# Patient Record
Sex: FEMALE | Race: WHITE | Employment: FULL TIME | URBAN - METROPOLITAN AREA
[De-identification: names, ages, dates, MRNs, and addresses within clinical notes are randomized per-mention and may not be internally consistent; named-entity substitution may affect disease eponyms.]

---

## 2022-06-22 ENCOUNTER — HOSPITAL ENCOUNTER (OUTPATIENT)
Dept: PHYSICAL THERAPY | Age: 47
Setting detail: RECURRING SERIES
Discharge: HOME | End: 2022-06-25
Payer: COMMERCIAL

## 2022-06-22 PROCEDURE — 97165 OT EVAL LOW COMPLEX 30 MIN: CPT

## 2022-06-22 PROCEDURE — 97535 SELF CARE MNGMENT TRAINING: CPT

## 2022-06-22 NOTE — THERAPY EVALUATION
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 Restrictions/Precautions:    No data recordedNo data recorded  Medications Last Reviewed:  2022     SUBJECTIVE   History of Injury/Illness (Reason for Referral):  Per MD note[de-identified] 2022: Office visit (Dr. Guillermo Sosa): Last seen, 3/6/2018, by partner, Dr. Agus Pinon, lost to follow up. She has established diagnosis of lymphedema praecox (onset age 14) that for many years only involved the lower extremities. Over the last several years, she has complained of swelling, stiffness and skin thickening of the posterior neck, upper back, anterior chest. She is compliant with her CPAP but notes increased weight gain and SHIN. Last ECHO was in 9775 with diastolic dysfunction and no mention of RT heart failure. Disposition: Referral for MLD. Upgrade pump. Check ECHO Emphasized compression DAILY. Patient Stated Goal(s):   \"Keep swelling down in upper extremity, neck, legs, and abdomen\"  Initial:     4/10 Post Session:     3/10  Past Medical History/Comorbidities:   Ms. Thalia Sepulveda  has no past medical history on file. Ms. Thalia Sepulveda  has no past surgical history on file. Social History/Living Environment:   Type of Home: House     Prior Level of Function/Work/Activity:   Occupation: Full time employment; Works at home  No data recordedNo data recorded   Learning:   Does the patient/guardian have any barriers to learning?: No barriers     Fall Risk Scale  Total Score: 0  Wise Fall Risk: Low (0-24)           OBJECTIVE   Lymphedema:  Circumference Measurements (Lower Extremity):    Date:  6/22/22 Date:      Centimeters from base of heel Right Left Right Left   Foot   28 28.5     Ankle  33 33.5     Calf 10 down knee 47 48     Knee  46 43     Thigh          Skin Integumentary:  Skin Integrity: Cracked  Pitting Area 1 Described: medial calf  Pitting Scale Area 1: 3+  Pitting Additional Areas: Yes  Pitting Area 2 Described: infeior interior thigh  Pitting Scale Area 2: 1+  Pitting Area 3 Described:  (Brawny)  Pitting Area 4 Described: Posterior NEck  Pitting Scale Area 4: N/A (Brawny)  Hair Growth: Sparce  Edema Rebound: Slow  Stemmer Sign: Positive    LLIS:  Total Score: 25        ASSESSMENT   Initial Assessment:  Patient with Primary Lymphedema starting at age 14. 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 Patient has many problematic areas but focus will be primarly on BLE's and posterior neck as time allows. Positive for Stemmers sign. Will need gradual drainage as all extremities have some impairment. Posterior neck is extremely fibrotic and has some pain associated with posture deficits. Problem List (Impacting functional limitations):  Performance deficits / Impairments: Decreased functional mobility ; Decreased ADL status;  Decreased high-level IADLs     Therapy Prognosis:   Prognosis: Fair Assessment Complexity:   Medium Complexity  PLAN   Effective Dates: 0/68/43  TO Certification Period Expiration Date: 09/20/22   . Frequency/Duration: Plan Frequency: 5,5,3,3,     Interventions Planned: (Treatment may consist of any combination of the following)  Current Treatment Recommendations: Strengthening; Functional mobility training; Manual Therapy:  STM; Self-Care / ADL; Manual Therapy:  MLD     Goals: (Goals have been discussed and agreed upon with patient.)  Short-Term Functional Goals: Time Frame: 3- days  1. The patient/caregiver will verbalize understanding of lymphedema precautions. 2. Patient will be minimal assist with skin care regimen to decrease risk of cellulitis. 3. The patient/caregiver will be minimal assist at donning and doffing bilateral lower extremity compression bandages. 4. The patient/caregiver will be minimal assist with self-manual lymph drainage techniques and show decrease in limb volume. 5. Patient will be minimal assist in lymphatic exercises. 6. Patient will don and doff neck compression to break up fibrotic tissue. Discharge Goals: Time Frame: 90 days  1. Patient's bilateral lower extremity circumferential measurements will decrease on volumetric graph to maximize functional use in ADL's.    2. The patient/caregiver will be independent with home management of lymphedema. 3. Patient/caregiver will be independent donning and doffing  All compression garment. 4. Patient will increase LLIS score by 5 points. 1.            MEDICAL NECESSITY:   Skilled intervention continues to be required due to Deficits listed above. REASON FOR SERVICES/OTHER COMMENTS:  Patient continues to require skilled intervention due to Dx above. Total Duration:  Time In: 1330  Time Out: 1400    Regarding Gloria Beth MD's therapy, I certify that the treatment plan above will be carried out by a therapist or under their direction.   Thank you for this referral,  Casimiro Paget MARILY Ambrocio     Referring Physician Signature: Sunday Wheeler MD No Signature is Required for this note.         Post Session Pain  Charge Capture   POC Link  Treatment Note Link  MD Guidelines  MyChart

## 2022-06-22 NOTE — PROGRESS NOTES
Rosa Vizcaino MD  : 1975  Primary: Casi Danisha Sc  Secondary:  [address] @ [facility]  [place] [facility] [address]  Phone: [phone]  Fax: [phone] Plan Frequency: 0,2,6,3,    Certification Period Expiration Date: 22      OT Visit Info: Total # of Visits to Date: 1      OUTPATIENT OCCUPATIONAL THERAPY: Treatment Note 2022  Appt Desk   Episode      Treatment Diagnosis: See eval  Medical/Referring Diagnosis:  Lymphedema, not elsewhere classified [I89.0]  Referring Physician:  Kate Rivera MD MD Orders:  OT Eval and Treat   Date of Onset:  Onset Date: 87     Allergies:  Patient has no allergy information on record. Restrictions/Precautions:    No data recordedNo data recorded  Medications Last Reviewed:  2022     Interventions Planned: (Treatment may consist of any combination of the following)  Current Treatment Recommendations: Strengthening; Functional mobility training; Manual Therapy:  STM; Self-Care / ADL; Manual Therapy:  MLD     Subjective Comments: See eval     Initial:     4/10 Post Session:     3/10  Medications Last Reviewed:  2022  Updated Objective Findings:  See evaluation note from today  Treatment     Therapeutic Exercise ( Minutes): Therapeutic exercises noted below to improve functional AROM, strength, mobility and reduce limb size.        Date:   Date:   Date:     Activity/Exercise Parameters Parameters Parameters   Diaphragmatic breathing      Hip Flexion      Hip Abduction      Knee Flexion and extension      Ankle Pumps      Ankle Circles      Toe Scrunches              Manual Lymph Drainage:  Lymph Nodes:    Cervical Supraclavicular Axillary Abdominal Inguinal Popliteal Antecubital   RIGHT []     []     []     []     []     []     []       LEFT []     []     []     []     []     []     []         Anastamoses:   Axillo-axillary Inguino-inguinal Axillo-inguinal Inguino-axillary   ANTERIOR [] []     []     []       POSTERIOR []     []     []     []       RIGHT []     []     []     []       LEFT []     []     []     []         Limbs:   []    RUE     []    LUE     []    RLE    []    LLE    Self Care (15 minutes): Educated on CDT, Skin care, Diaphragmatic breathing, lymphatic health, diet, and exercise. · Treatment/Session Summary:  See evaluation  · Treatment Assessment:       · Communication/Consultation:  None today  · Equipment provided today:  None  · Recommendations/Intent for next treatment session: Next visit will focus on complete CDT.     Total Treatment Billable Duration: 15 minutes  OT Individual Minutes  Time In: 1330  Time Out: 1400  Minutes: [address], OT    Post Session Pain  Charge Capture   POC Link  Treatment Note Link  MD Guidelines  MyChart    Future Appointments   Date Time Provider Bassam Marmolejo   7/11/2022  4:00 PM [place] Bettie, OT [facility]   7/12/2022  4:00 PM [place] Bettie, OT [facility]   7/13/2022  4:00 PM [place], OT [facility]   7/14/2022  4:00 PM [place] Bettie, OT [facility]   7/15/2022  8:00 AM [place] Regino, OT [facility]   7/18/2022  4:00 PM [place], OT [facility]   7/19/2022  4:00 PM [place], OT [facility]   7/20/2022  4:00 PM [place], OT [facility]   7/21/2022  4:00 PM [place] Bettie, OT [facility]   7/22/2022  8:00 AM Darroll [place], OT [facility]   7/25/2022  4:00 PM [place] Bettie, OT [facility]   7/26/2022  4:00 PM [place], OT [facility]   7/27/2022  4:00 PM [place] Bettie, OT [facility]   8/16/2022  4:00 PM [place] Bettie, OT [facility]   8/18/2022  4:00 PM [place] Bettie, OT [facility]   8/19/2022  8:00 AM [place] Bettie, OT [facility]

## 2022-06-23 ASSESSMENT — PAIN SCALES - GENERAL: PAINLEVEL_OUTOF10: 4

## 2022-07-11 ENCOUNTER — HOSPITAL ENCOUNTER (OUTPATIENT)
Dept: PHYSICAL THERAPY | Age: 47
Setting detail: RECURRING SERIES
Discharge: HOME | End: 2022-07-14
Payer: COMMERCIAL

## 2022-07-11 PROCEDURE — 97535 SELF CARE MNGMENT TRAINING: CPT

## 2022-07-11 PROCEDURE — 97140 MANUAL THERAPY 1/> REGIONS: CPT

## 2022-07-12 ENCOUNTER — HOSPITAL ENCOUNTER (OUTPATIENT)
Dept: PHYSICAL THERAPY | Age: 47
Setting detail: RECURRING SERIES
Discharge: HOME | End: 2022-07-15
Payer: COMMERCIAL

## 2022-07-12 PROCEDURE — 97140 MANUAL THERAPY 1/> REGIONS: CPT

## 2022-07-12 PROCEDURE — 97535 SELF CARE MNGMENT TRAINING: CPT

## 2022-07-12 ASSESSMENT — PAIN SCALES - GENERAL: PAINLEVEL_OUTOF10: 4

## 2022-07-12 NOTE — PROGRESS NOTES
Young Mendez MD  : 1975  Primary: [address]  Secondary:  [address] @ [address] [place] [address]  Phone: [phone]  Fax: [phone] Plan Frequency: 7,0,7,8,    Certification Period Expiration Date: 22      OT Visit Info: Total # of Visits to Date: 2      OUTPATIENT OCCUPATIONAL THERAPY: Treatment Note 2022  Appt Desk   Episode      Treatment Diagnosis: See eval  Medical/Referring Diagnosis:  Lymphedema, not elsewhere classified [I89.0]  Referring Physician:  Mary Rod MD MD Orders:  OT Eval and Treat   Date of Onset:  Onset Date: 87     Allergies:  Patient has no allergy information on record. Restrictions/Precautions:    No data recordedNo data recorded  Medications Last Reviewed:  2022     Interventions Planned: (Treatment may consist of any combination of the following)  Current Treatment Recommendations: Strengthening; Functional mobility training; Manual Therapy:  STM; Self-Care / ADL; Manual Therapy:  MLD     Subjective Comments: I was able to spend more time with my legs up during vacation so my legs are slightly better today. Initial:     4/10 Post Session:     3/10  Medications Last Reviewed:  2022  Updated Objective Findings:  None Today  Treatment     Therapeutic Exercise ( Minutes): Therapeutic exercises noted below to improve functional AROM, strength, mobility and reduce limb size. Date:   Date:   Date:     Activity/Exercise Parameters Parameters Parameters   Diaphragmatic breathing      Hip Flexion      Hip Abduction      Knee Flexion and extension      Ankle Pumps      Ankle Circles      Toe Scrunches              Manual Therapy (45) Minutes: MLD completed in supine, able to work include all extremities and routed to the trunk to increase overall lymphatic flow and drainage.      Manual Lymph Drainage:  Lymph Nodes:    Cervical Supraclavicular Axillary Abdominal Inguinal Popliteal Antecubital   RIGHT [x]     [x]     [x]     [x]     [x]     [x]     [x]       LEFT [x]     [x]     [x]     [x]     [x]     [x]     [x]         Anastamoses:   Axillo-axillary Inguino-inguinal Axillo-inguinal Inguino-axillary   ANTERIOR []     []     []     []       POSTERIOR []     []     []     []       RIGHT []     []     []     []       LEFT []     []     []     []         Limbs:   []    RUE     []    LUE     []    RLE    []    LLE    Self Care (30 minutes): Komprex 2 used on distal calves. 1/2 foam used on bilateral ankles. 3 short stretch bandages on each leg. Cottonette used as base layer with Eucerin lotion. Educated on CDT, Skin care, Diaphragmatic breathing, lymphatic health, diet, and exercise. Added KT Greyson tape. To posterior neck to increase lymphatic flow directed to posterior side of axilla. · Treatment/Session Summary:    · Treatment Assessment:  Tolerated first bandaging well. · Communication/Consultation:  None today  · Equipment provided today:  None  · Recommendations/Intent for next treatment session: Next visit will focus on complete CDT.     Total Treatment Billable Duration: 75 minutes  OT Individual Minutes  Time In: 4473  Time Out: 3610  Minutes: Yvan Maher    Post Session Pain  Charge Capture   POC Link  Treatment Note Link  MD Guidelines  MyChart    Future Appointments   Date Time Provider Bassam Marmolejo   7/12/2022  4:00 PM Gore Oddi, OT [facility]   7/13/2022  4:00 PM Trevor Oddi, OT [facility]   7/14/2022  4:00 PM [place] Oddi, OT [facility]   7/15/2022  8:00 AM Lena Pattee, OT [facility]   7/18/2022  4:00 PM Trevor Oddi, OT [facility]   7/19/2022  4:00 PM Trevor Oddi, OT [facility]   7/20/2022  4:00 PM Trevor Oddi, OT [facility]   7/21/2022  4:00 PM [place] Oddi, OT [facility]   7/22/2022  8:00 AM Lena Pattee, OT [facility]   7/25/2022  4:00 PM Trevor Medina OT [facility]   7/26/2022  4:00 PM Xiomara Fraction, OT [facility]   7/27/2022  4:00 PM Ximoara Fraction, OT [facility]   8/16/2022  4:00 PM Xiomara Fraction, OT [facility]   8/18/2022  4:00 PM Xiomara Fraction, OT [facility]   8/19/2022  8:00 AM Xiomara Fraction, OT [facility]

## 2022-07-12 NOTE — PROGRESS NOTES
Billy Dowling MD  : 1975  Primary: [address]  Secondary:  [address] @ [address] [address]  Phone: [phone]  Fax: [phone] Plan Frequency: 4,5,1,3,    Certification Period Expiration Date: 22      OT Visit Info: Total # of Visits to Date: 2      OUTPATIENT OCCUPATIONAL THERAPY: Treatment Note 2022  Appt Desk   Episode      Treatment Diagnosis: See eval  Medical/Referring Diagnosis:  Lymphedema, not elsewhere classified [I89.0]  Referring Physician:  Burt Abad MD MD Orders:  OT Eval and Treat   Date of Onset:  Onset Date: 87     Allergies:  Patient has no allergy information on record. Restrictions/Precautions:    No data recordedNo data recorded  Medications Last Reviewed:  2022     Interventions Planned: (Treatment may consist of any combination of the following)  Current Treatment Recommendations: Strengthening; Functional mobility training; Manual Therapy:  STM; Self-Care / ADL; Manual Therapy:  MLD     Subjective Comments: I was able to spend more time with my legs up during vacation so my legs are slightly better today. Initial:      /10 Post Session:      /10  Medications Last Reviewed:  2022  Updated Objective Findings:  None Today  Treatment     Therapeutic Exercise ( Minutes): Therapeutic exercises noted below to improve functional AROM, strength, mobility and reduce limb size. Date:   Date:   Date:     Activity/Exercise Parameters Parameters Parameters   Diaphragmatic breathing      Hip Flexion      Hip Abduction      Knee Flexion and extension      Ankle Pumps      Ankle Circles      Toe Scrunches              Manual Therapy (30) Minutes: MLD completed in supine and prone, able to work include all extremities and routed to the trunk to increase overall lymphatic flow and drainage.      Manual Lymph Drainage:  Lymph Nodes:    Cervical Supraclavicular Axillary Abdominal Inguinal Popliteal Antecubital   RIGHT [x]     [x]     [x]     [x]     [x]     [x]     [x]       LEFT [x]     [x]     [x]     [x]     [x]     [x]     [x]         Anastamoses:   Axillo-axillary Inguino-inguinal Axillo-inguinal Inguino-axillary   ANTERIOR []     []     []     []       POSTERIOR []     []     []     []       RIGHT []     []     []     []       LEFT []     []     []     []         Limbs:   []    RUE     []    LUE     []    RLE    []    LLE    Self Care (30 minutes): Komprex 2 used on distal calves. 1/2 foam used on bilateral ankles. 3 short stretch bandages on right leg and 4 short stretch on left leg. Cottonette used as base layer with Eucerin lotion. No toe wraps at patients request.     KT lamont tape held well at posterior neck to increase lymphatic flow directed to posterior side of axilla. · Treatment/Session Summary:    · Treatment Assessment:  Legs starting to soften fibrotic area of calves after first day of bandaging. · Communication/Consultation:  None today  · Equipment provided today:  None  · Recommendations/Intent for next treatment session: Next visit will focus on complete CDT.     Total Treatment Billable Duration: 60 minutes  OT Individual Minutes  Time In: 9073  Time Out: 3283  Minutes: [address] [place] [address], OT    Post Session Pain  Charge Capture   POC Link  Treatment Note Link  MD Guidelines  MyChart    Future Appointments   Date Time Provider Bassam Marmolejo   7/13/2022  4:00 PM Tabitha Gray, OT [facility]   7/14/2022  4:00 PM Tabitha Gray, OT [facility]   7/15/2022  8:00 AM May Guzman, OT [facility]   7/18/2022  4:00 PM Tabitha Gray, OT [facility]   7/19/2022  4:00 PM Tabitha Gray, OT [facility]   7/20/2022  4:00 PM Tabitha Gray, OT [facility]   7/21/2022  4:00 PM Tabitha Gray, OT [facility]   7/22/2022  8:00 AM May Guzman, OT [facility]   7/25/2022  4:00 PM Tabitha Gray, OT [facility]   7/26/2022  4:00 PM Tabitha Gray, OT [facility]   7/27/2022  4:00 PM Buddy Thompson, OT [facility]   8/16/2022  4:00 PM Buddy Thompson, OT [facility]   8/18/2022  4:00 PM Buddy Thompson, OT [facility]   8/19/2022  8:00 AM Buddy Thompson, OT [facility]

## 2022-07-13 ENCOUNTER — HOSPITAL ENCOUNTER (OUTPATIENT)
Dept: PHYSICAL THERAPY | Age: 47
Setting detail: RECURRING SERIES
Discharge: HOME | End: 2022-07-16
Payer: COMMERCIAL

## 2022-07-13 PROCEDURE — 97140 MANUAL THERAPY 1/> REGIONS: CPT

## 2022-07-13 PROCEDURE — 97535 SELF CARE MNGMENT TRAINING: CPT

## 2022-07-14 ENCOUNTER — HOSPITAL ENCOUNTER (OUTPATIENT)
Dept: PHYSICAL THERAPY | Age: 47
Setting detail: RECURRING SERIES
Discharge: HOME | End: 2022-07-17
Payer: COMMERCIAL

## 2022-07-14 PROCEDURE — 97535 SELF CARE MNGMENT TRAINING: CPT

## 2022-07-14 PROCEDURE — 97140 MANUAL THERAPY 1/> REGIONS: CPT

## 2022-07-14 NOTE — PROGRESS NOTES
Beatrice Stringer MD  : 1975  Primary: [address]  Secondary:  [address] @ [address] [address]  Phone: [phone]  Fax: [phone] Plan Frequency: 7,8,1,1,    Certification Period Expiration Date: 22      OT Visit Info: Total # of Visits to Date: 4      OUTPATIENT OCCUPATIONAL THERAPY: Treatment Note 2022  Appt Desk   Episode      Treatment Diagnosis: See eval  Medical/Referring Diagnosis:  Lymphedema, not elsewhere classified [I89.0]  Referring Physician:  Ken Gray MD MD Orders:  OT Eval and Treat   Date of Onset:  Onset Date: 87     Allergies:  Patient has no allergy information on record. Restrictions/Precautions:    No data recordedNo data recorded  Medications Last Reviewed:  2022     Interventions Planned: (Treatment may consist of any combination of the following)  Current Treatment Recommendations: Strengthening; Functional mobility training; Manual Therapy:  STM; Self-Care / ADL; Manual Therapy:  MLD     Subjective Comments: My neck gets better and worse throughout the week, my legs got tight after my workout . Initial:      /10 Post Session:      /10  Medications Last Reviewed:  2022  Updated Objective Findings:  None Today  Treatment     Therapeutic Exercise ( Minutes): Therapeutic exercises noted below to improve functional AROM, strength, mobility and reduce limb size. Date:   Date:   Date:     Activity/Exercise Parameters Parameters Parameters   Diaphragmatic breathing      Hip Flexion      Hip Abduction      Knee Flexion and extension      Ankle Pumps      Ankle Circles      Toe Scrunches              Manual Therapy (55) Minutes: MLD completed in supine and prone, able to work include all extremities and routed to the trunk to increase overall lymphatic flow and drainage.      Manual Lymph Drainage:  Lymph Nodes:    Cervical Supraclavicular Axillary Abdominal Inguinal Popliteal Antecubital   RIGHT [x]     [x]     [x]     [x]     [x]     [x]     [x]       LEFT [x]     [x]     [x]     [x]     [x]     [x]     [x]         Anastamoses:   Axillo-axillary Inguino-inguinal Axillo-inguinal Inguino-axillary   ANTERIOR []     []     []     []       POSTERIOR []     []     []     []       RIGHT []     []     []     []       LEFT []     []     []     []         Limbs:   []    RUE     []    LUE     []    RLE    []    LLE    Self Care (30 minutes): Komprex 2 used on distal calves. 1/2 foam used on bilateral ankles. 3 short stretch bandages on right leg and 3 short stretch on left leg. Cottonette used as base layer with Eucerin lotion. KT lamont tape removed this session as it was itchy and falling off. Treatment/Session Summary:    Treatment Assessment:  Legs getting softer and improving, but slowly due to whole body affects of lymphedema. Communication/Consultation:  None today  Equipment provided today:  None  Recommendations/Intent for next treatment session: Next visit will focus on complete CDT.     Total Treatment Billable Duration: 85 minutes  OT Individual Minutes  Time In: 1600  Time Out: 7698  Minutes: 80    Jonda Pitch, OT    Post Session Pain  Charge Capture   POC Link  Treatment Note Link  MD Guidelines  MyChart    Future Appointments   Date Time Provider Bassam Marmolejo   7/15/2022 11:00 AM Jonda Pitch, OT [facility]   7/18/2022  4:00 PM Jonda Pitch, OT [facility]   7/19/2022  4:00 PM Jonda Pitch, OT [facility]   7/20/2022  4:00 PM Jonda Pitch, OT [facility]   7/21/2022  4:00 PM Jonda Pitch, OT [facility]   7/22/2022  8:00 AM Jonas Burgos, OT [facility]   7/25/2022  4:00 PM Jonda Pitch, OT [facility]   7/26/2022  4:00 PM Jonda Pitch, OT [facility]   7/27/2022  4:00 PM Jonda Pitch, OT [facility]   8/16/2022  4:00 PM Jonda Pitch, OT [facility]   8/18/2022  4:00 PM Skinny Ragsdale OT [facility]   8/19/2022 8:00 AM Clarisa Mallory OT [facility]

## 2022-07-15 ENCOUNTER — HOSPITAL ENCOUNTER (OUTPATIENT)
Dept: PHYSICAL THERAPY | Age: 47
Setting detail: RECURRING SERIES
Discharge: HOME | End: 2022-07-18
Payer: COMMERCIAL

## 2022-07-15 PROCEDURE — 97535 SELF CARE MNGMENT TRAINING: CPT

## 2022-07-15 PROCEDURE — 97140 MANUAL THERAPY 1/> REGIONS: CPT

## 2022-07-15 NOTE — PROGRESS NOTES
Shahid Dozier MD  : 1975  Primary: [address]  Secondary:  [address] @ [address] [place] [address]  Phone: [phone]  Fax: [phone] Plan Frequency: 7,0,9,6,    Certification Period Expiration Date: 22      OT Visit Info: Total # of Visits to Date: 5      OUTPATIENT OCCUPATIONAL THERAPY: Treatment Note 7/15/2022  Appt Desk   Episode      Treatment Diagnosis: See eval  Medical/Referring Diagnosis:  Lymphedema, not elsewhere classified [I89.0]  Referring Physician:  Leroy Maria MD MD Orders:  OT Eval and Treat   Date of Onset:  Onset Date: 87     Allergies:  Patient has no allergy information on record. Restrictions/Precautions:    No data recordedNo data recorded  Medications Last Reviewed:  7/15/2022     Interventions Planned: (Treatment may consist of any combination of the following)  Current Treatment Recommendations: Strengthening; Functional mobility training; Manual Therapy:  STM; Self-Care / ADL; Manual Therapy:  MLD     Subjective Comments: My neck gets better and worse throughout the week, my legs got tight after my workout . Initial:      /10 Post Session:      /10  Medications Last Reviewed:  7/15/2022  Updated Objective Findings:  None Today  Treatment     Therapeutic Exercise ( Minutes): Therapeutic exercises noted below to improve functional AROM, strength, mobility and reduce limb size. Date:   Date:   Date:     Activity/Exercise Parameters Parameters Parameters   Diaphragmatic breathing      Hip Flexion      Hip Abduction      Knee Flexion and extension      Ankle Pumps      Ankle Circles      Toe Scrunches              Manual Therapy (30) Minutes: MLD completed in supine and prone, able to work include all extremities and routed to the trunk to increase overall lymphatic flow and drainage.      Manual Lymph Drainage:  Lymph Nodes:    Cervical Supraclavicular Axillary Abdominal Inguinal Popliteal Antecubital   RIGHT [x]     [x]     [x]     [x]     [x]     [x]     [x]       LEFT [x]     [x]     [x]     [x]     [x]     [x]     [x]         Anastamoses:   Axillo-axillary Inguino-inguinal Axillo-inguinal Inguino-axillary   ANTERIOR []     []     []     []       POSTERIOR []     []     []     []       RIGHT []     []     []     []       LEFT []     []     []     []         Limbs:   []    RUE     []    LUE     []    RLE    []    LLE    Self Care (30 minutes): Komprex 2 used on distal calves. 1/2 foam used on bilateral ankles. 3 short stretch bandages on right leg and 3 short stretch on left leg. Cottonette used as base layer with Eucerin lotion. Some self bandaging teaching as the weekend is approaching. KT lamont tape starting to peel. Treatment/Session Summary:    Treatment Assessment:  Solid and consistent improvement. Left foot reducing quicker. Communication/Consultation:  None today  Equipment provided today:  None  Recommendations/Intent for next treatment session: Next visit will focus on complete CDT.     Total Treatment Billable Duration: 60 minutes  OT Individual Minutes  Time In: 1100  Time Out: 1200  Minutes: [address] [place] [address], OT    Post Session Pain  Charge Capture   POC Link  Treatment Note Link  MD Guidelines  MyChart    Future Appointments   Date Time Provider Bassam Marmolejo   7/18/2022  4:00 PM Willye Fought, OT [facility]   7/19/2022  4:00 PM Willye Fought, OT [facility]   7/20/2022  4:00 PM Willye Fought, OT [facility]   7/21/2022  4:00 PM Willye Fought, OT [facility]   7/22/2022  8:00 AM Kenji Weber, OT [facility]   7/25/2022  4:00 PM Willye Fought, OT [facility]   7/26/2022  4:00 PM Willye Fought, OT [facility]   7/27/2022  4:00 PM Willye Fought, OT [facility]   8/16/2022  4:00 PM Willye Fought, OT [facility]   8/18/2022  4:00 PM Willye Fought, OT [facility]   8/19/2022  8:00 AM Ollie Taylor, OT [facility]

## 2022-07-18 ENCOUNTER — HOSPITAL ENCOUNTER (OUTPATIENT)
Dept: PHYSICAL THERAPY | Age: 47
Setting detail: RECURRING SERIES
Discharge: HOME | End: 2022-07-21
Payer: COMMERCIAL

## 2022-07-18 PROCEDURE — 97140 MANUAL THERAPY 1/> REGIONS: CPT

## 2022-07-18 PROCEDURE — 97535 SELF CARE MNGMENT TRAINING: CPT

## 2022-07-19 ENCOUNTER — HOSPITAL ENCOUNTER (OUTPATIENT)
Dept: PHYSICAL THERAPY | Age: 47
Setting detail: RECURRING SERIES
Discharge: HOME | End: 2022-07-22
Payer: COMMERCIAL

## 2022-07-19 PROCEDURE — 97535 SELF CARE MNGMENT TRAINING: CPT

## 2022-07-19 PROCEDURE — 97140 MANUAL THERAPY 1/> REGIONS: CPT

## 2022-07-19 NOTE — PROGRESS NOTES
Billy Dowling MD  : 1975  Primary: [address]  Secondary:  [address] @ [address] [address]  Phone: [phone]  Fax: [phone] Plan Frequency: 9,1,4,8,    Certification Period Expiration Date: 22      OT Visit Info: Total # of Visits to Date: 7      OUTPATIENT OCCUPATIONAL THERAPY: Treatment Note 2022  Appt Desk   Episode      Treatment Diagnosis: See eval  Medical/Referring Diagnosis:  Lymphedema, not elsewhere classified [I89.0]  Referring Physician:  Burt Abad MD MD Orders:  OT Eval and Treat   Date of Onset:  Onset Date: 87     Allergies:  Patient has no allergy information on record. Restrictions/Precautions:    No data recordedNo data recorded  Medications Last Reviewed:  2022     Interventions Planned: (Treatment may consist of any combination of the following)  Current Treatment Recommendations: Strengthening; Functional mobility training; Manual Therapy:  STM; Self-Care / ADL; Manual Therapy:  MLD     Subjective Comments: I knew it worked from referring patients, but I didn't know what to expect for myself. .     Initial:      /10 Post Session:      /10  Medications Last Reviewed:  2022  Updated Objective Findings:  None Today  Treatment     Therapeutic Exercise ( Minutes): Therapeutic exercises noted below to improve functional AROM, strength, mobility and reduce limb size. Date:   Date:   Date:     Activity/Exercise Parameters Parameters Parameters   Diaphragmatic breathing      Hip Flexion      Hip Abduction      Knee Flexion and extension      Ankle Pumps      Ankle Circles      Toe Scrunches              Manual Therapy (30) Minutes: MLD completed in supine and prone, able to work include all extremities and routed to the trunk to increase overall lymphatic flow and drainage.      Manual Lymph Drainage:  Lymph Nodes:    Cervical Supraclavicular Axillary Abdominal Inguinal Popliteal Antecubital   RIGHT [x]     [x]     [x]     [x]     [x]     [x]     [x]       LEFT [x]     [x]     [x]     [x]     [x]     [x]     [x]         Anastamoses:   Axillo-axillary Inguino-inguinal Axillo-inguinal Inguino-axillary   ANTERIOR []     []     []     []       POSTERIOR []     []     []     []       RIGHT []     []     []     []       LEFT []     []     []     []         Limbs:   [x]    RUE     [x]    LUE     [x]    RLE    [x]    LLE    Self Care (30 minutes): Komprex 2 used on distal calves. 1/2 foam used on bilateral ankles. 3 short stretch bandages on right leg and 3 short stretch on left leg. Cottonette used as base layer with Eucerin lotion. Treatment/Session Summary:    Treatment Assessment:  Solid and consistent improvement. Left foot reducing quicker. Patient happy with BLE reduction. Communication/Consultation:  None today  Equipment provided today:  None  Recommendations/Intent for next treatment session: Next visit will focus on complete CDT.     Total Treatment Billable Duration: 60 minutes  OT Individual Minutes  Time In: 6717  Time Out: 7880  Minutes: [address] [place] [address], OT    Post Session Pain  Charge Capture   POC Link  Treatment Note Link  MD Guidelines  MyChart    Future Appointments   Date Time Provider Bassam Marmolejo   7/20/2022  4:00 PM [place], Yvan [facility]   7/21/2022  4:00 PM [place], OT [facility]   7/22/2022  8:00 AM David Lubin, OT [facility]   7/25/2022  4:00 PM [place], OT [facility]   7/26/2022  4:00 PM [place], OT [facility]   7/27/2022  4:00 PM [place], OT [facility]   8/16/2022  4:00 PM [place], OT [facility]   8/18/2022  4:00 PM [place], OT [facility]   8/19/2022  8:00 AM [place], OT [facility]

## 2022-07-19 NOTE — PROGRESS NOTES
Karl Ponce MD  : 1975  Primary: [address]  Secondary:  [address] @ [address] [place] [address]  Phone: [phone]  Fax: [phone] Plan Frequency: 8,5,2,0,    Certification Period Expiration Date: 22      OT Visit Info: Total # of Visits to Date: 6      OUTPATIENT OCCUPATIONAL THERAPY: Treatment Note 2022  Appt Desk   Episode      Treatment Diagnosis: See eval  Medical/Referring Diagnosis:  Lymphedema, not elsewhere classified [I89.0]  Referring Physician:  Katie Adkins MD MD Orders:  OT Eval and Treat   Date of Onset:  Onset Date: 87     Allergies:  Patient has no allergy information on record. Restrictions/Precautions:    No data recordedNo data recorded  Medications Last Reviewed:  2022     Interventions Planned: (Treatment may consist of any combination of the following)  Current Treatment Recommendations: Strengthening; Functional mobility training; Manual Therapy:  STM; Self-Care / ADL; Manual Therapy:  MLD     Subjective Comments: I knew it worked from referring patients, but I didn't know what to expect for myself. .     Initial:      /10 Post Session:      /10  Medications Last Reviewed:  2022  Updated Objective Findings:  None Today  Treatment     Therapeutic Exercise ( Minutes): Therapeutic exercises noted below to improve functional AROM, strength, mobility and reduce limb size. Date:   Date:   Date:     Activity/Exercise Parameters Parameters Parameters   Diaphragmatic breathing      Hip Flexion      Hip Abduction      Knee Flexion and extension      Ankle Pumps      Ankle Circles      Toe Scrunches              Manual Therapy (30) Minutes: MLD completed in supine and prone, able to work include all extremities and routed to the trunk to increase overall lymphatic flow and drainage.      Manual Lymph Drainage:  Lymph Nodes:    Cervical Supraclavicular Axillary Abdominal

## 2022-07-20 ENCOUNTER — APPOINTMENT (OUTPATIENT)
Dept: PHYSICAL THERAPY | Age: 47
End: 2022-07-20
Payer: COMMERCIAL

## 2022-07-21 ENCOUNTER — HOSPITAL ENCOUNTER (OUTPATIENT)
Dept: PHYSICAL THERAPY | Age: 47
Setting detail: RECURRING SERIES
Discharge: HOME | End: 2022-07-24
Payer: COMMERCIAL

## 2022-07-21 PROCEDURE — 97535 SELF CARE MNGMENT TRAINING: CPT

## 2022-07-21 PROCEDURE — 97140 MANUAL THERAPY 1/> REGIONS: CPT

## 2022-07-21 NOTE — PROGRESS NOTES
Alford Oppenheim, MD  : 1975  Primary: [address]  Secondary:  [address] @ [address] [place] [address]  Phone: [phone]  Fax: [phone] Plan Frequency: 7,8,5,8,    Certification Period Expiration Date: 22      OT Visit Info: Total # of Visits to Date: 7      OUTPATIENT OCCUPATIONAL THERAPY: Treatment Note 2022  Appt Desk   Episode      Treatment Diagnosis: See eval  Medical/Referring Diagnosis:  Lymphedema, not elsewhere classified [I89.0]  Referring Physician:  Isaac Rene MD MD Orders:  OT Eval and Treat   Date of Onset:  Onset Date: 87     Allergies:  Patient has no allergy information on record. Restrictions/Precautions:    No data recordedNo data recorded  Medications Last Reviewed:  2022     Interventions Planned: (Treatment may consist of any combination of the following)  Current Treatment Recommendations: Strengthening; Functional mobility training; Manual Therapy:  STM; Self-Care / ADL; Manual Therapy:  MLD     Subjective Comments: I knew it worked from referring patients, but I didn't know what to expect for myself. .     Initial:      /10 Post Session:      /10  Medications Last Reviewed:  2022  Updated Objective Findings:  None Today  Treatment     Therapeutic Exercise ( Minutes): Therapeutic exercises noted below to improve functional AROM, strength, mobility and reduce limb size. Date:   Date:   Date:     Activity/Exercise Parameters Parameters Parameters   Diaphragmatic breathing      Hip Flexion      Hip Abduction      Knee Flexion and extension      Ankle Pumps      Ankle Circles      Toe Scrunches              Manual Therapy (15) Minutes: MLD completed in supine and prone, able to work include all extremities and routed to the trunk to increase overall lymphatic flow and drainage.      Manual Lymph Drainage:  Lymph Nodes:    Cervical Supraclavicular Axillary Abdominal Inguinal Popliteal Antecubital   RIGHT [x]     [x]     [x]     [x]     [x]     [x]     [x]       LEFT [x]     [x]     [x]     [x]     [x]     [x]     [x]         Anastamoses:   Axillo-axillary Inguino-inguinal Axillo-inguinal Inguino-axillary   ANTERIOR []     []     []     []       POSTERIOR []     []     []     []       RIGHT []     []     []     []       LEFT []     []     []     []         Limbs:   [x]    RUE     [x]    LUE     [x]    RLE    [x]    LLE    Self Care (45 minutes): Komprex 2 used on distal calves. 1/2 foam used on bilateral ankles. 3 short stretch bandages on right leg and 3 short stretch on left leg. Cottonette used as base layer with Eucerin lotion. Treatment/Session Summary:    Treatment Assessment:  Solid and consistent improvement. Left foot reducing quicker. Patient happy with BLE reduction. Communication/Consultation:  None today  Equipment provided today:  None  Recommendations/Intent for next treatment session: Next visit will focus on complete CDT.     Total Treatment Billable Duration: 60 minutes  OT Individual Minutes  Time In: 1600  Time Out: 1700  Minutes: [address] [place] [address], OT    Post Session Pain  Charge Capture   POC Link  Treatment Note Link  MD Guidelines  MyChart    Future Appointments   Date Time Provider Bassam Marmolejo   7/22/2022  9:00 AM Krystyna Candelario OT [facility]   7/25/2022  4:00 PM [facility], OT [facility]   7[facility]26[facility]2022  [facility]   7/27/2022  4:00 [facility]   8/16/2022  4:00 [facility]   8/18/2022  [facility]:00 [facility]   8/19/2022  [facility]

## 2022-07-22 ENCOUNTER — HOSPITAL ENCOUNTER (OUTPATIENT)
Dept: PHYSICAL THERAPY | Age: 47
Setting detail: RECURRING SERIES
Discharge: HOME | End: 2022-07-25
Payer: COMMERCIAL

## 2022-07-22 PROCEDURE — 97140 MANUAL THERAPY 1/> REGIONS: CPT

## 2022-07-22 PROCEDURE — 97535 SELF CARE MNGMENT TRAINING: CPT

## 2022-07-22 NOTE — PROGRESS NOTES
Tan Camilo MD  : 1975  Primary: [address]  Secondary:  [address] @ [address] [place] [address]  Phone: [phone]  Fax: [phone] Plan Frequency: 4,0,1,5,    Certification Period Expiration Date: 22      OT Visit Info: Total # of Visits to Date: 10      OUTPATIENT OCCUPATIONAL THERAPY: Treatment Note 2022  Appt Desk   Episode      Treatment Diagnosis: See eval  Medical/Referring Diagnosis:  Lymphedema, not elsewhere classified [I89.0]  Referring Physician:  Noemi Cameron MD MD Orders:  OT Eval and Treat   Date of Onset:  Onset Date: 87     Allergies:  Patient has no allergy information on record. Restrictions/Precautions:    No data recordedNo data recorded  Medications Last Reviewed:  2022     Interventions Planned: (Treatment may consist of any combination of the following)  Current Treatment Recommendations: Strengthening; Functional mobility training; Manual Therapy:  STM; Self-Care / ADL; Manual Therapy:  MLD     Subjective Comments: \"My neck is hard as a board\"     Initial:     0 /10 Post Session:     0 /10  Medications Last Reviewed:  2022  Updated Objective Findings: Cumulative circumferential volumetric measurements taken of B LEs. R LE reduced 23.5 cm and L LE reduced 17 cm as compared to initial measurements. Treatment     Therapeutic Exercise ( Minutes): Therapeutic exercises noted below to improve functional AROM, strength, mobility and reduce limb size. Date:   Date:   Date:     Activity/Exercise Parameters Parameters Parameters   Diaphragmatic breathing      Hip Flexion      Hip Abduction      Knee Flexion and extension      Ankle Pumps      Ankle Circles      Toe Scrunches              Manual Therapy (45) Minutes: MLD completed in supine, able to work include all extremities and routed to the trunk to increase overall lymphatic flow and drainage.      Manual Lymph Drainage:  Lymph Nodes:    Cervical Supraclavicular Axillary Abdominal Inguinal Popliteal Antecubital   RIGHT [x]     [x]     [x]     [x]     [x]     [x]     [x]       LEFT [x]     [x]     [x]     [x]     [x]     [x]     [x]         Anastamoses:   Axillo-axillary Inguino-inguinal Axillo-inguinal Inguino-axillary   ANTERIOR []     []     []     []       POSTERIOR []     []     []     []       RIGHT []     []     []     []       LEFT []     []     []     []         Limbs:   [x]    RUE     [x]    LUE     [x]    RLE    [x]    LLE    Self Care (15 minutes): 1/2\" foam used on bilateral ankles. 3 short stretch bandages on right leg and 3 short stretch on left leg over Rosidal foam and Artiflex padding. Cottonette used as base layer with Eucerin lotion. Treatment/Session Summary:    Treatment Assessment:  Solid and consistent improvement. Left foot reducing quicker. Patient happy with BLE reduction. Will address neck compression next session. Discussed Bioflect capris for compression of legs and abdomen. Pt to leave on airplane Friday for cross country flight and needs to address compression management for traveling. Recommending Bioflect capris and class 1 knee highs. Pt to procure. Communication/Consultation:  None today  Equipment provided today:  None  Recommendations/Intent for next treatment session: Next visit will focus on complete CDT.     Total Treatment Billable Duration: 60 minutes  OT Individual Minutes  Time In: 0900  Time Out: 1000  Minutes: [address], OT    Post Session Pain  Charge Capture   POC Link  Treatment Note Link  MD Guidelines  MyChart    Future Appointments   Date Time Provider Bassam Marmolejo   7/25/2022  4:00 PM Kristal Cough, [facility]   7/26/2022  4:00 PM Kristal Cough, OT [facility]   7/27/2022  4:00 PM Kristal Cough, OT [facility]   8/16/2022  4:00 PM Kristal Cough, OT [facility]   8/18/2022  4:00 PM Kristal Cough, OT [facility]   8/19/2022  8:00 AM Julia Griffith, OT [facility]